# Patient Record
Sex: FEMALE | Race: WHITE | NOT HISPANIC OR LATINO | Employment: FULL TIME | URBAN - NONMETROPOLITAN AREA
[De-identification: names, ages, dates, MRNs, and addresses within clinical notes are randomized per-mention and may not be internally consistent; named-entity substitution may affect disease eponyms.]

---

## 2017-09-08 ENCOUNTER — OFFICE VISIT - GICH (OUTPATIENT)
Dept: FAMILY MEDICINE | Facility: OTHER | Age: 42
End: 2017-09-08

## 2017-09-08 ENCOUNTER — HISTORY (OUTPATIENT)
Dept: FAMILY MEDICINE | Facility: OTHER | Age: 42
End: 2017-09-08

## 2017-09-08 DIAGNOSIS — N99.820 POSTPROCEDURAL HEMORRHAGE OF A GENITOURINARY SYSTEM ORGAN OR STRUCTURE FOLLOWING A GENITOURINARY SYSTEM PROCEDURE: ICD-10-CM

## 2017-09-08 DIAGNOSIS — R10.9 ABDOMINAL PAIN: ICD-10-CM

## 2017-09-08 DIAGNOSIS — R39.9 UNSPECIFIED SYMPTOMS AND SIGNS INVOLVING THE GENITOURINARY SYSTEM: ICD-10-CM

## 2017-09-08 LAB
ABSOLUTE BASOPHILS - HISTORICAL: 0.1 THOU/CU MM
ABSOLUTE EOSINOPHILS - HISTORICAL: 0.3 THOU/CU MM
ABSOLUTE IMMATURE GRANULOCYTES(METAS,MYELOS,PROS) - HISTORICAL: 0.1 THOU/CU MM
ABSOLUTE LYMPHOCYTES - HISTORICAL: 2.5 THOU/CU MM (ref 0.9–2.9)
ABSOLUTE MONOCYTES - HISTORICAL: 0.5 THOU/CU MM
ABSOLUTE NEUTROPHILS - HISTORICAL: 6.4 THOU/CU MM (ref 1.7–7)
BACTERIA URINE: ABNORMAL BACTERIA/HPF
BASOPHILS # BLD AUTO: 0.6 %
BILIRUB UR QL: NEGATIVE
CLARITY, URINE: CLEAR CLARITY
COLOR UR: YELLOW COLOR
EOSINOPHIL NFR BLD AUTO: 2.5 %
EPITHELIAL CELLS: ABNORMAL EPI/HPF
ERYTHROCYTE [DISTWIDTH] IN BLOOD BY AUTOMATED COUNT: 11.9 % (ref 11.5–15.5)
GLUCOSE URINE: NEGATIVE MG/DL
HCT VFR BLD AUTO: 35.3 % (ref 33–51)
HEMOGLOBIN: 11.9 G/DL (ref 12–16)
IMMATURE GRANULOCYTES(METAS,MYELOS,PROS) - HISTORICAL: 0.7 %
KETONES UR QL: NEGATIVE MG/DL
LEUKOCYTE ESTERASE URINE: ABNORMAL
LYMPHOCYTES NFR BLD AUTO: 25.2 % (ref 20–44)
MCH RBC QN AUTO: 29.5 PG (ref 26–34)
MCHC RBC AUTO-ENTMCNC: 33.7 G/DL (ref 32–36)
MCV RBC AUTO: 87 FL (ref 80–100)
MONOCYTES NFR BLD AUTO: 5.4 %
NEUTROPHILS NFR BLD AUTO: 65.6 % (ref 42–72)
NITRITE UR QL STRIP: NEGATIVE
OCCULT BLOOD,URINE - HISTORICAL: ABNORMAL
PH UR: 5.5 [PH]
PLATELET # BLD AUTO: 315 THOU/CU MM (ref 140–440)
PMV BLD: 9.6 FL (ref 6.5–11)
PROTEIN QUALITATIVE,URINE - HISTORICAL: NEGATIVE MG/DL
RBC - HISTORICAL: ABNORMAL /HPF
RED BLOOD COUNT - HISTORICAL: 4.04 MIL/CU MM (ref 4–5.2)
SP GR UR STRIP: 1.01
UROBILINOGEN,QUALITATIVE - HISTORICAL: NORMAL EU/DL
WBC - HISTORICAL: ABNORMAL /HPF
WHITE BLOOD COUNT - HISTORICAL: 9.8 THOU/CU MM (ref 4.5–11)

## 2017-09-10 LAB — CULTURE - HISTORICAL: NORMAL

## 2017-09-11 ENCOUNTER — COMMUNICATION - GICH (OUTPATIENT)
Dept: FAMILY MEDICINE | Facility: OTHER | Age: 42
End: 2017-09-11

## 2017-12-28 NOTE — TELEPHONE ENCOUNTER
Patient Information     Patient Name MRN Sex Marialuisa Levi [id number] Female 1975      Telephone Encounter by Yessenia Gilbert at 2017  4:32 PM     Author:  Yessenia Gilbert Service:  (none) Author Type:  (none)     Filed:  2017  4:34 PM Encounter Date:  2017 Status:  Signed     :  Yessenia Gilbert            PT CALLED.  SAW BLANCA JOHNSON ON . WOULD LIKE TO KNOW RESULTS OF TEST.  CALL [phone]

## 2017-12-28 NOTE — PROGRESS NOTES
Patient Information     Patient Name MRN Sex Marialuisa Levi [id number] Female 1975      Progress Notes by Kate Lofton NP at 2017  4:15 PM     Author:  Kate Lofton NP Service:  (none) Author Type:  PHYS- Nurse Practitioner     Filed:  2017  5:54 PM Encounter Date:  2017 Status:  Signed     :  Kate Lofton NP (PHYS- Nurse Practitioner)            Nursing Notes:   Fannie Streeter  2017  4:44 PM  Signed  Patient presents to the clinic for post hysterectomy. Surgery was approx two weeks ago, felt great the first week, now this week has been experiencing lower back pain. Wants to rule out UTI. S/s include lower back pain and bleeding. Bleeding is occurring all the time. Soaking more than one pad a day. Is bright red blood. Maybe concerned about popping a stitch?   Fannie Streeter LPN............................ 2017 4:36 PM     HPI:   Marialuisa Salgado is a 41 y.o. female who presents for bladder concerns.  She had laparoscopic hysterectomy two weeks ago by Dr. Berkowitz at [facility] in [place].  States she was catheterized x 1 for 600 ml after the procedure.  Increased urinary frequency.  No urgency.  No dysuria or pain.  Some back pain earlier this week, resolved now.  Alternating warm and cold but states this is her normal, no change.  No fevers.  No nausea or vomiting.  Appetite fair.  Chronic diarrhea, no change.   States feeling fine the first week after the surgery.  Now with lower back pain and vaginal bleeding the past week.   States just bleeding the first week with wiping.  Now this week she states she is using one pad per day except yesterday needed two pads, back to one pad today.  Blood is bright red, until today the blood is both red and brown.  Yesterday she felt a gush of blood while urinating and noted increased blood after urinating.  Denies any clots or tissues. Left abdominal laparoscopic incision site with visible suture.  Localized  "tenderness on the left side - she reports the surgeon told her to expect more pain on the left due to having to cut away some tissue around the left ovary.  Both ovaries are still present per patient.  Denies any vaginal pain.  Denies any vaginal irritation or itching.  Denies any dizziness or light headedness.  Fatigued.   States she gets her medical care in [place].  Occasional Ibuprofen.  Took Aleve yesterday which states really helped.          No past medical history on file.    No past surgical history on file.    Social History     Substance Use Topics       Smoking status: Never Smoker     Smokeless tobacco: Never Used     Alcohol use No       No current outpatient prescriptions on file.     No current facility-administered medications for this visit.      Medications have been reviewed by me and are current to the best of my knowledge and ability.      Allergies     Allergen  Reactions     Amoxicillin Rash       REVIEW OF SYSTEMS:  Refer to HPI.      EXAM:   Vitals:    /64  Pulse 74  Temp 97.4  F (36.3  C) (Tympanic)  Resp 18  Ht 1.549 m (5' 1\")  Wt 90.7 kg (200 lb)  LMP 05/20/2015  Breastfeeding? No  BMI 37.79 kg/m2    General Appearance: Pleasant, alert, appropriate appearance for age. No acute distress  Chest/Respiratory Exam: Normal chest wall and respirations. Clear to auscultation.  Cardiovascular Exam: Regular rate and rhythm. S1, S2, no murmur, click, gallop, or rubs.  Gastrointestinal Exam: Soft, no masses or organomegaly. Abdomen with mild generalized tenderness over the lower quadrants bilaterally.  No rebound tenderness or guarding.  Normal BS x 4. Diffuse suprapubic tenderness. Mild bilateral CVA tenderness to palpation  Genital Female Exam:  Patient refuses vaginal exam at this time  Psychiatric Exam: Alert and oriented - appropriate affect.    Labs:   Results for orders placed or performed in visit on 09/08/17      URINALYSIS W REFLEX MICROSCOPIC IF POSITIVE      Result  " Value Ref Range    COLOR                     Yellow Yellow Color    CLARITY                   Clear Clear Clarity    SPECIFIC GRAVITY,URINE    1.010 1.010, 1.015, 1.020, 1.025                    PH,URINE                  5.5 6.0, 7.0, 8.0, 5.5, 6.5, 7.5, 8.5                    UROBILINOGEN,QUALITATIVE  Normal Normal EU/dl    PROTEIN, URINE Negative Negative mg/dL    GLUCOSE, URINE Negative Negative mg/dL    KETONES,URINE             Negative Negative mg/dL    BILIRUBIN,URINE           Negative Negative                    OCCULT BLOOD,URINE        Large (A) Negative                    NITRITE                   Negative Negative                    LEUKOCYTE ESTERASE        Moderate (A) Negative                   URINALYSIS MICROSCOPIC      Result  Value Ref Range    RBC 3-5 (A) 0-2, None Seen /HPF    WBC 6-10 (A) 0-2, 3-5, None Seen /HPF    BACTERIA                  Few None Seen, Rare, Occasional, Few Bacteria/HPF    EPITHELIAL CELLS          None Seen None Seen, Few Epi/HPF   CBC WITH AUTO DIFFERENTIAL      Result  Value Ref Range    WHITE BLOOD COUNT         9.8 4.5 - 11.0 thou/cu mm    RED BLOOD COUNT           4.04 4.00 - 5.20 mil/cu mm    HEMOGLOBIN                11.9 (L) 12.0 - 16.0 g/dL    HEMATOCRIT                35.3 33.0 - 51.0 %    MCV                       87 80 - 100 fL    MCH                       29.5 26.0 - 34.0 pg    MCHC                      33.7 32.0 - 36.0 g/dL    RDW                       11.9 11.5 - 15.5 %    PLATELET COUNT            315 140 - 440 thou/cu mm    MPV                       9.6 6.5 - 11.0 fL    NEUTROPHILS               65.6 42.0 - 72.0 %    LYMPHOCYTES               25.2 20.0 - 44.0 %    MONOCYTES                 5.4 <12.0 %    EOSINOPHILS               2.5 <8.0 %    BASOPHILS                 0.6 <3.0 %    IMMATURE GRANULOCYTES(METAS,MYELOS,PROS) 0.7 %    ABSOLUTE NEUTROPHILS      6.4 1.7 - 7.0 thou/cu mm    ABSOLUTE LYMPHOCYTES      2.5 0.9 - 2.9 thou/cu mm    ABSOLUTE  MONOCYTES        0.5 <0.9 thou/cu mm    ABSOLUTE EOSINOPHILS      0.3 <0.5 thou/cu mm    ABSOLUTE BASOPHILS        0.1 <0.3 thou/cu mm    ABSOLUTE IMMATURE GRANULOCYTES(METAS,MYELOS,PROS) 0.1 <=0.3 thou/cu mm       ASSESSMENT AND PLAN:      ICD-10-CM    1. Flank pain R10.9 URINALYSIS W REFLEX MICROSCOPIC IF POSITIVE      URINALYSIS W REFLEX MICROSCOPIC IF POSITIVE      URINALYSIS MICROSCOPIC      URINALYSIS MICROSCOPIC      URINE CULTURE   2. Postoperative vaginal bleeding following genitourinary procedure N99.820 CBC WITH DIFFERENTIAL      CBC WITH DIFFERENTIAL      CBC WITH AUTO DIFFERENTIAL   3. UTI symptoms R39.9 trimethoprim-sulfamethoxazole, 160-800 mg, (BACTRIM DS, SEPTRA DS) tablet       I spoke with Dr. Stroud, OB/GYN on call from [facility] via phone regarding patient's symptoms of vaginal bleeding.   Per Dr. Stroud, the vaginal bleeding is not concerning at the current amount.  Patient is afebrile.  No vomiting, etc.  Check CBC and is WBC and Hgb stable then can discharge home.  Follow up early next week with Dr. House if symptoms persist.  Go to ER sooner if worsening, fevers, vomiting, or concerns.  Post op hgb of 11.5 at [facility].      CBC - hgb stable at 11.9, normal WBC  Urinalysis - mild RBCs, mild WBCs, few bacteria  Urine culture pending  Bactrim 160-800 mg BID x 3 days  Encouraged fluids and frequent bladder emptying.  Will call if culture warrants change of abx.   Follow up with Dr. House next week if symptoms persist, go to ER if worsening or concerns       Patient Instructions   Hemoglobin stable    Normal blood counts     Antibiotic has been sent to pharmacy. Please take full course of antibiotic even if symptoms have completely resolved. This helps prevent against antibiotic resistance.     We will culture the urine to see what bacteria grows out of the urine.  We will call the patient if a change of antibiotic is necessary per the culture.      Patient was instructed in  increase fluids including water and cranberry juice.      Monitor for fevers, chills, vomiting - follow up if occurring.    Return to clinic if symptoms are not resolved.  Call clinic if symptoms change/worsen.          BLANCA JOHNSON NP..................9/8/2017 4:41 PM

## 2017-12-28 NOTE — TELEPHONE ENCOUNTER
Patient Information     Patient Name MRN Sex Marialuisa Levi [id number] Female 1975      Telephone Encounter by Blanca Johnson NP at 2017  8:40 PM     Author:  Blanca Johnson NP Service:  (none) Author Type:  PHYS- Nurse Practitioner     Filed:  2017  8:41 PM Encounter Date:  2017 Status:  Signed     :  Blanca Johnson NP (PHYS- Nurse Practitioner)            No urine infection per culture  Showed low amounts of mixed bacteria - likely contaminants.    BLANCA JOHNSON NP ....................  2017   8:40 PM

## 2017-12-28 NOTE — TELEPHONE ENCOUNTER
Patient Information     Patient Name MRN Marialuisa Braun [id number] Female 1975      Telephone Encounter by Fannie Streeter at 2017  8:38 PM     Author:  Fannie Streeter Service:  (none) Author Type:  NURS- Student Practical Nurse     Filed:  2017  8:39 PM Encounter Date:  2017 Status:  Signed     :  Fannie Streeter (NURS- Student Practical Nurse)            Patient is looking for urine culture results. Please advise.   Fannie Streeter LPN............................ 2017 8:38 PM

## 2017-12-28 NOTE — PATIENT INSTRUCTIONS
Patient Information     Patient Name MRN Sex Marialuisa Levi [id number] Female 1975      Patient Instructions by Kate Lofton NP at 2017  4:15 PM     Author:  Kate Lofton NP Service:  (none) Author Type:  PHYS- Nurse Practitioner     Filed:  2017  5:32 PM Encounter Date:  2017 Status:  Signed     :  Kate Lofton NP (PHYS- Nurse Practitioner)            Hemoglobin stable    Normal blood counts     Antibiotic has been sent to pharmacy. Please take full course of antibiotic even if symptoms have completely resolved. This helps prevent against antibiotic resistance.     We will culture the urine to see what bacteria grows out of the urine.  We will call the patient if a change of antibiotic is necessary per the culture.      Patient was instructed in increase fluids including water and cranberry juice.      Monitor for fevers, chills, vomiting - follow up if occurring.    Return to clinic if symptoms are not resolved.  Call clinic if symptoms change/worsen.

## 2017-12-28 NOTE — TELEPHONE ENCOUNTER
Patient Information     Patient Name MRN Marialuisa Braun [id number] Female 1975      Telephone Encounter by Fannie Streeter at 2017  8:43 PM     Author:  Fannie Streeter Service:  (none) Author Type:  NURS- Student Practical Nurse     Filed:  2017  8:44 PM Encounter Date:  2017 Status:  Signed     :  Fannie Streeter (NURS- Student Practical Nurse)            Called and spoke with patient after proper verification. Informed patient of below message from Kate Lofton NP. Patient states understanding and no further questions at this time.    Fannie Streeter LPN............................ 2017 8:43 PM

## 2017-12-30 NOTE — NURSING NOTE
Patient Information     Patient Name MRN Marialuisa Braun [id number] Female 1975      Nursing Note by Fannie Streeter at 2017  4:15 PM     Author:  Fannie Streeter Service:  (none) Author Type:  NURS- Student Practical Nurse     Filed:  2017  4:44 PM Encounter Date:  2017 Status:  Signed     :  Fannie Streeter (NURS- Student Practical Nurse)            Patient presents to the clinic for post hysterectomy. Surgery was approx two weeks ago, felt great the first week, now this week has been experiencing lower back pain. Wants to rule out UTI. S/s include lower back pain and bleeding. Bleeding is occurring all the time. Soaking more than one pad a day. Is bright red blood. Maybe concerned about popping a stitch?   Fannie Streeter LPN............................ 2017 4:36 PM

## 2018-01-24 ENCOUNTER — DOCUMENTATION ONLY (OUTPATIENT)
Dept: FAMILY MEDICINE | Facility: OTHER | Age: 43
End: 2018-01-24

## 2018-01-26 VITALS
RESPIRATION RATE: 18 BRPM | WEIGHT: 200 LBS | HEART RATE: 74 BPM | HEIGHT: 61 IN | TEMPERATURE: 97.4 F | BODY MASS INDEX: 37.76 KG/M2 | DIASTOLIC BLOOD PRESSURE: 64 MMHG | SYSTOLIC BLOOD PRESSURE: 110 MMHG

## 2018-11-04 ENCOUNTER — OFFICE VISIT (OUTPATIENT)
Dept: FAMILY MEDICINE | Facility: OTHER | Age: 43
End: 2018-11-04
Payer: COMMERCIAL

## 2018-11-04 VITALS
HEART RATE: 92 BPM | BODY MASS INDEX: 39.41 KG/M2 | TEMPERATURE: 98.2 F | SYSTOLIC BLOOD PRESSURE: 122 MMHG | DIASTOLIC BLOOD PRESSURE: 80 MMHG | WEIGHT: 208.6 LBS | OXYGEN SATURATION: 95 %

## 2018-11-04 DIAGNOSIS — L51.9 ERYTHEMA MULTIFORME: ICD-10-CM

## 2018-11-04 PROCEDURE — 99213 OFFICE O/P EST LOW 20 MIN: CPT | Performed by: FAMILY MEDICINE

## 2018-11-04 ASSESSMENT — PAIN SCALES - GENERAL: PAINLEVEL: NO PAIN (0)

## 2018-11-04 NOTE — MR AVS SNAPSHOT
"              After Visit Summary   2018    Marialuisa Salgado    MRN: [id number]           Patient Information     Date Of Birth          1975        Visit Information        Provider Department      2018 2:00 PM Shorty Castelan MD [facility]        Today's Diagnoses     Erythema multiforme           Follow-ups after your visit        Who to contact     If you have questions or need follow up information about today's clinic visit or your schedule please contact [facility] directly at [phone].  Normal or non-critical lab and imaging results will be communicated to you by [employer], letter or phone within 4 business days after the clinic has received the results. If you do not hear from us within 7 days, please contact the clinic through [employer] or phone. If you have a critical or abnormal lab result, we will notify you by phone as soon as possible.  Submit refill requests through [employer] or call your pharmacy and they will forward the refill request to us. Please allow 3 business days for your refill to be completed.          Additional Information About Your Visit        MyChart Information     [employer] lets you send messages to your doctor, view your test results, renew your prescriptions, schedule appointments and more. To sign up, go to [email or web address] . Click on \"Log in\" on the left side of the screen, which will take you to the Welcome page. Then click on \"Sign up Now\" on the right side of the page.     You will be asked to enter the access code listed below, as well as some personal information. Please follow the directions to create your username and password.     Your access code is: [email or web address]  Expires: 2/3/2019  7:54 AM     Your access code will  in 90 days. If you need help or a new code, please call your [facility] or [phone].        Care EveryWhere ID     This is your Care EveryWhere ID. This could be used by other " organizations to access your [place] medical records  [id number]        Your Vitals Were     Pulse Temperature Pulse Oximetry Breastfeeding? BMI (Body Mass Index)       92 98.2  F (36.8  C) (Tympanic) 95% No 39.41 kg/m2        Blood Pressure from Last 3 Encounters:   11/04/18 122/80   09/08/17 110/64   06/25/16 122/82    Weight from Last 3 Encounters:   11/04/18 208 lb 9.6 oz (94.6 kg)   09/08/17 200 lb (90.7 kg)   06/25/16 201 lb (91.2 kg)              Today, you had the following     No orders found for display       Primary Care Provider Office Phone # Fax #    Yady Barbour [phone]       [facility] [address] [facility] [address]        Equal Access to Services     ARNULFO ALCANTAR : Imelda Tolbert, warowena beck, julian kaalgrant moise, noemí sullivan . So [facility] [phone].    ATENCIÓN: Si habla español, tiene a gilmore disposición servicios gratuitos de asistencia lingüística. Tirso al [phone].    We comply with applicable federal civil rights laws and [place] laws. We do not discriminate on the basis of race, color, national origin, age, disability, sex, sexual orientation, or gender identity.            Thank you!     Thank you for choosing [facility] AND \[facility]\""  for your care. Our goal is always to provide you with excellent care. Hearing back from our patients is one way we can continue to improve our services. Please take a few minutes to complete the written survey that you may receive in the mail after your visit with us. Thank you!             Your Updated Medication List - Protect others around you: Learn how to safely use, store and throw away your medicines at www.disposemymeds.org.      Notice  As of 11/4/2018 11:59 PM    You have not been prescribed any medications.

## 2018-11-04 NOTE — NURSING NOTE
Patient presents to the clinic for a rash of her trunk and is now on the back of her right leg for a few days.  Yue Guerrero LPN 11/4/2018   2:09 PM    Med rec-complete

## 2018-11-05 PROBLEM — L51.9 ERYTHEMA MULTIFORME: Status: ACTIVE | Noted: 2018-11-05

## 2018-11-05 NOTE — PROGRESS NOTES
SUBJECTIVE:   Marialuisa Salgado is a 42 year old female who presents to clinic today for the following health issues: Patient arrives here for rash    HPI Comments: Patient reports the rash is been going on since last Thursday.  Reports is itchy.  Mainly located around the abdomen.  She has been taking Benadryl without any improvement.  Denies any other complaints at weight loss no exposures that she is aware of.  She denies any cough or congestion.        There are no active problems to display for this patient.    History reviewed. No pertinent past medical history.   History reviewed. No pertinent surgical history.  No current outpatient prescriptions on file.     Allergies   Allergen Reactions     Amoxicillin Rash       Review of Systems     OBJECTIVE:     /80 (BP Location: Left arm, Patient Position: Sitting, Cuff Size: Adult Regular)  Pulse 92  Temp 98.2  F (36.8  C) (Tympanic)  Wt 208 lb 9.6 oz (94.6 kg)  SpO2 95%  Breastfeeding? No  BMI 39.41 kg/m2  Body mass index is 39.41 kg/(m^2).  Physical Exam   Constitutional: She appears well-developed.   HENT:   Head: Normocephalic.   Right Ear: External ear normal.   Eyes: Pupils are equal, round, and reactive to light.   Cardiovascular: Normal rate and regular rhythm.    Pulmonary/Chest: Effort normal and breath sounds normal.   Abdominal: Soft.   Musculoskeletal: Normal range of motion.   Neurological: She is alert.   Skin:   Patient has multiple annular lesions with some central clearing.  No scaling.  Biggest one measures about 2-3 cm mainly located around the abdomen there is no ulcerations.  Lesions are not palpable or tender       none     ASSESSMENT/PLAN:         1. Erythema multiforme  Etiology unclear.  Likely viral in origin.  Doubt vasculitis.  Recommended observation.  Benadryl as needed.  Follow-up if getting worse.        Shorty Castelan MD  [facility]

## 2019-01-11 ENCOUNTER — OFFICE VISIT (OUTPATIENT)
Dept: FAMILY MEDICINE | Facility: OTHER | Age: 44
End: 2019-01-11
Attending: NURSE PRACTITIONER
Payer: COMMERCIAL

## 2019-01-11 VITALS
TEMPERATURE: 96.9 F | DIASTOLIC BLOOD PRESSURE: 80 MMHG | HEIGHT: 62 IN | RESPIRATION RATE: 16 BRPM | HEART RATE: 74 BPM | BODY MASS INDEX: 38.26 KG/M2 | WEIGHT: 207.9 LBS | SYSTOLIC BLOOD PRESSURE: 108 MMHG

## 2019-01-11 DIAGNOSIS — J02.9 VIRAL PHARYNGITIS: Primary | ICD-10-CM

## 2019-01-11 DIAGNOSIS — R07.0 THROAT PAIN: ICD-10-CM

## 2019-01-11 LAB
DEPRECATED S PYO AG THROAT QL EIA: NORMAL
SPECIMEN SOURCE: NORMAL

## 2019-01-11 PROCEDURE — 87081 CULTURE SCREEN ONLY: CPT | Performed by: NURSE PRACTITIONER

## 2019-01-11 PROCEDURE — 87880 STREP A ASSAY W/OPTIC: CPT | Performed by: NURSE PRACTITIONER

## 2019-01-11 PROCEDURE — 99214 OFFICE O/P EST MOD 30 MIN: CPT | Performed by: NURSE PRACTITIONER

## 2019-01-11 RX ORDER — CETIRIZINE HYDROCHLORIDE 10 MG/1
10 TABLET ORAL
COMMUNITY
End: 2022-04-07

## 2019-01-11 RX ORDER — IBUPROFEN 800 MG/1
800 TABLET, FILM COATED ORAL
COMMUNITY
Start: 2017-08-26 | End: 2022-04-07

## 2019-01-11 RX ORDER — ALBUTEROL SULFATE 90 UG/1
2 AEROSOL, METERED RESPIRATORY (INHALATION)
COMMUNITY
Start: 2011-03-15 | End: 2022-04-07

## 2019-01-11 RX ORDER — ACETAMINOPHEN 500 MG
1000 TABLET ORAL
COMMUNITY
Start: 2017-08-26 | End: 2022-04-07

## 2019-01-11 ASSESSMENT — MIFFLIN-ST. JEOR: SCORE: 1548.34

## 2019-01-11 ASSESSMENT — PAIN SCALES - GENERAL: PAINLEVEL: EXTREME PAIN (9)

## 2019-01-12 NOTE — PATIENT INSTRUCTIONS
Rapid strep is negative, throat culture is pending.  Will call you if culture is positive and treat with antibiotics if needed.   Rest and push fluids. Take Tylenol or ibuprofen for pain if needed.   F/u with Primary Care in 3 days if not improving.    Patient Education     When You Have a Sore Throat    A sore throat can be painful. There are many reasons why you may have a sore throat. Your healthcare provider will work with you to find the cause of your sore throat. He or she will also find the best treatment for you.  What causes a sore throat?  Sore throats can be caused or worsened by:    Cold or flu viruses    Bacteria    Irritants such as tobacco smoke or air pollution    Acid reflux  A healthy throat  The tonsils are on the sides of the throat near the base of the tongue. They collect viruses and bacteria and help fight infection. The throat (pharynx) is the passage for air. Mucus from the nasal cavity also moves down the passage.  An inflamed throat  The tonsils and pharynx can become inflamed due to a cold or flu virus. Postnasal drip (excess mucus draining from the nasal cavity) can irritate the throat. It can also make the throat or tonsils more likely to be infected by bacteria. Severe, untreated tonsillitis in children or adults can cause a pocket of pus (abscess) to form near the tonsil.  Your evaluation  A medical evaluation can help find the cause of your sore throat. It can also help your healthcare provider choose the best treatment for you. The evaluation may include a health history, physical exam, and diagnostic tests.  Health history  Your healthcare provider may ask you the following:    How long has the sore throat lasted and how have you been treating it?    Do you have any other symptoms, such as body aches, fever, or cough?    Does your sore throat recur? If so, how often? How many days of school or work have you missed because of a sore throat?    Do you have trouble eating or  "swallowing?    Have you been told that you snore or have other sleep problems?    Do you have bad breath?    Do you cough up bad-tasting mucus?  Physical exam  During the exam, your healthcare provider checks your ears, nose, and throat for problems. He or she also checks for swelling in the neck, and may listen to your chest.  Possible tests  Other tests your healthcare provider may perform include:    A throat swab to check for bacteria such as streptococcus (the bacteria that causes strep throat)    A blood test to check for mononucleosis (a viral infection)    A chest X-ray to rule out pneumonia, especially if you have a cough  Treating a sore throat  Treatment depends on many factors. What is the likely cause? Is the problem recent? Does it keep coming back? In many cases, the best thing to do is to treat the symptoms, rest, and let the problem heal itself. Antibiotics may help clear up some bacterial infections. For cases of severe or recurring tonsillitis, the tonsils may need to be removed.  Relieving your symptoms    Don t smoke, and avoid secondhand smoke.    For children, try throat sprays or Popsicles. Adults and older children may try lozenges.    Drink warm liquids to soothe the throat and help thin mucus. Avoid alcohol, spicy foods, and acidic drinks such as orange juice. These can irritate the throat.    Gargle with warm saltwater (1 teaspoon of salt to 8 ounces of warm water).    Use a humidifier to keep air moist and relieve throat dryness.    Try over-the-counter pain relievers such as acetaminophen or ibuprofen. Use as directed, and don t exceed the recommended dose. Don t give aspirin to children.   Are antibiotics needed?  If your sore throat is due to a bacterial infection, antibiotics may speed healing and prevent complications. Although group A streptococcus (\"strep throat\" or GAS) is the major treatable infection for a sore throat, GAS causes only 5% to 15% of sore throats in adults who " seek medical care. Most sore throats are caused by cold or flu viruses. And antibiotics don t treat viral illness. In fact, using antibiotics when they re not needed may produce bacteria that are harder to kill. Your healthcare provider will prescribe antibiotics only if he or she thinks they are likely to help.  If antibiotics are prescribed  Take the medicine exactly as directed. Be sure to finish your prescription even if you re feeling better. And be sure to ask your healthcare provider or pharmacist what side effects are common and what to do about them.  Is surgery needed?  In some cases, tonsils need to be removed. This is often done as outpatient (same-day) surgery. Your healthcare provider may advise removing the tonsils in cases of:    Several severe bouts of tonsillitis in a year.  Severe  episodes include those that lead to missed days of school or work, or that need to be treated with antibiotics.    Tonsillitis that causes breathing problems during sleep    Tonsillitis caused by food particles collecting in pouches in the tonsils (cryptic tonsillitis)  Call your healthcare provider if any of the following occur:    Symptoms worsen, or new symptoms develop.    Swollen tonsils make breathing difficult.    The pain is severe enough to keep you from drinking liquids.    A skin rash, hives, or wheezing develops. Any of these could signal an allergic reaction to antibiotics.    Symptoms don t improve within a week.    Symptoms don t improve within 2 to 3 days of starting antibiotics.   Date Last Reviewed: 10/1/2016    6094-3236 [employer]. [address] [place] [address], [place] [address]. All rights reserved. This information is not intended as a substitute for professional medical care. Always follow your healthcare professional's instructions.

## 2019-01-12 NOTE — NURSING NOTE
Onset:   1/4/19  Fever:  y  Exposure:  y  Pain scale:  9  Headache:y    Rash:  n  Associated symptoms:  Stomach ache  Treating with tylenol cold and sinus.  Claire Victoria LPN....................  1/11/2019   6:52 PM    Chief Complaint   Patient presents with     Throat Problem       Medication Reconciliation: complete    Claire Victoria LPN

## 2019-01-12 NOTE — PROGRESS NOTES
Chief Complaint   Patient presents with     Throat Problem     SUBJECTIVE:   Marialuisa Salgado presents with a sore throat for 7 days. Slight cough, some sinus congestion, no fever. Taking decongestants and Tylenol for symptoms. Exposed many illnesses at work. Eating and drinking ok. Thought it was her reflux, taking ranitidine without improvement of symptoms.      Nursing Notes:   Claire Victoria LPN  1/11/2019  6:52 PM  Sign at exiting of workspace  Onset:   1/4/19  Fever:  y  Exposure:  y  Pain scale:  9  Headache:y    Rash:  n  Associated symptoms:  Stomach ache  Treating with tylenol cold and sinus.  Claire Victoria LPN....................  1/11/2019   6:52 PM    Chief Complaint   Patient presents with     Throat Problem       Medication Reconciliation: complete    Claire Victoria LPN          Allergies   Allergen Reactions     Amoxicillin Rash     Patient Active Problem List   Diagnosis     Erythema multiforme     Current Outpatient Medications   Medication     acetaminophen (TYLENOL) 500 MG tablet     ibuprofen (ADVIL/MOTRIN) 800 MG tablet     albuterol (PROVENTIL HFA) 108 (90 Base) MCG/ACT inhaler     cetirizine (ZYRTEC) 10 MG tablet     No current facility-administered medications for this visit.      History reviewed. No pertinent past medical history.  History reviewed. No pertinent surgical history.  Social History     Socioeconomic History     Marital status:      Spouse name: Not on file     Number of children: Not on file     Years of education: Not on file     Highest education level: Not on file   Social Needs     Financial resource strain: Not on file     Food insecurity - worry: Not on file     Food insecurity - inability: Not on file     Transportation needs - medical: Not on file     Transportation needs - non-medical: Not on file   Occupational History     Not on file   Tobacco Use     Smoking status: Never Smoker     Smokeless tobacco: Never Used   Substance and Sexual Activity      "Alcohol use: No     Drug use: No     Sexual activity: Not on file   Other Topics Concern     Parent/sibling w/ CABG, MI or angioplasty before 65F 55M? Not Asked   Social History Narrative     Not on file     ROS:  no fever, eating and drinking some.  Has a cough and sinus congestion.  Sore throat is persistent and not improving. Denies abdominal pain.  No rash.  Has a known exposure to strep at school.    /80 (BP Location: Left arm, Patient Position: Sitting, Cuff Size: Adult Large)   Pulse 74   Temp 96.9  F (36.1  C) (Tympanic)   Resp 16   Ht 1.562 m (5' 1.5\")   Wt 94.3 kg (207 lb 14.4 oz)   Breastfeeding? No   BMI 38.65 kg/m    EXAM:  Constitutional: healthy, alert and no distress   Cardiovascular:  RRR. No murmurs, clicks gallops or rub  Respiratory: Lungs clear  Psychiatric: mentation appears normal and affect normal/bright  Head: Normocephalic.    ENT: ENT exam normal, no neck nodes or sinus tenderness  SKIN: no suspicious lesions or rashes  LYMPH: Normal cervical lymph nodes  JOINT/EXTREMITIES: extremities normal- no gross deformities noted and gait normal    ASSESSMENT:   (J02.9) Viral pharyngitis  (primary encounter diagnosis)    (R07.0) Throat pain  Plan: Strep, Rapid Screen, Throat Strep A Culture    Patient is afebrile, rapid strep is negative.   Throat culture added on d/t exposure.   Will notify if positive and treat PRN.  Will treat as viral and advised conservative treatments.   I explained my diagnostic considerations and recommendations to the patient, who voiced understanding and agreement with the treatment plan. All questions were answered. We discussed potential side effects of any prescribed or recommended therapies, as well as expectations for response to treatments.   Advised to contact PCP if there is no improvement or worsening of conditions or symptoms.  Given Epic educational materials.     "

## 2019-01-14 LAB
BACTERIA SPEC CULT: NORMAL
SPECIMEN SOURCE: NORMAL

## 2019-08-29 ENCOUNTER — OFFICE VISIT (OUTPATIENT)
Dept: FAMILY MEDICINE | Facility: OTHER | Age: 44
End: 2019-08-29
Attending: PHYSICIAN ASSISTANT
Payer: COMMERCIAL

## 2019-08-29 VITALS
DIASTOLIC BLOOD PRESSURE: 80 MMHG | TEMPERATURE: 97.4 F | HEART RATE: 80 BPM | RESPIRATION RATE: 18 BRPM | WEIGHT: 211.4 LBS | OXYGEN SATURATION: 97 % | HEIGHT: 62 IN | SYSTOLIC BLOOD PRESSURE: 112 MMHG | BODY MASS INDEX: 38.9 KG/M2

## 2019-08-29 DIAGNOSIS — R42 DIZZINESS: Primary | ICD-10-CM

## 2019-08-29 DIAGNOSIS — B34.9 VIRAL ILLNESS: ICD-10-CM

## 2019-08-29 LAB
ALBUMIN SERPL-MCNC: 4.2 G/DL (ref 3.5–5.7)
ALBUMIN UR-MCNC: NEGATIVE MG/DL
ALP SERPL-CCNC: 68 U/L (ref 34–104)
ALT SERPL W P-5'-P-CCNC: 18 U/L (ref 7–52)
ANION GAP SERPL CALCULATED.3IONS-SCNC: 8 MMOL/L (ref 3–14)
APPEARANCE UR: CLEAR
AST SERPL W P-5'-P-CCNC: 13 U/L (ref 13–39)
BASOPHILS # BLD AUTO: 0.1 10E9/L (ref 0–0.2)
BASOPHILS NFR BLD AUTO: 1.1 %
BILIRUB SERPL-MCNC: 0.3 MG/DL (ref 0.3–1)
BILIRUB UR QL STRIP: NEGATIVE
BUN SERPL-MCNC: 12 MG/DL (ref 7–25)
CALCIUM SERPL-MCNC: 9.1 MG/DL (ref 8.6–10.3)
CHLORIDE SERPL-SCNC: 104 MMOL/L (ref 98–107)
CO2 SERPL-SCNC: 25 MMOL/L (ref 21–31)
COLOR UR AUTO: YELLOW
CREAT SERPL-MCNC: 0.72 MG/DL (ref 0.6–1.2)
CRP SERPL-MCNC: 0.7 MG/L
DIFFERENTIAL METHOD BLD: NORMAL
EOSINOPHIL # BLD AUTO: 0.3 10E9/L (ref 0–0.7)
EOSINOPHIL NFR BLD AUTO: 4.3 %
ERYTHROCYTE [DISTWIDTH] IN BLOOD BY AUTOMATED COUNT: 12.5 % (ref 10–15)
GFR SERPL CREATININE-BSD FRML MDRD: 89 ML/MIN/{1.73_M2}
GLUCOSE SERPL-MCNC: 90 MG/DL (ref 70–105)
GLUCOSE UR STRIP-MCNC: NEGATIVE MG/DL
HCT VFR BLD AUTO: 40.6 % (ref 35–47)
HGB BLD-MCNC: 13.8 G/DL (ref 11.7–15.7)
HGB UR QL STRIP: NEGATIVE
IMM GRANULOCYTES # BLD: 0 10E9/L (ref 0–0.4)
IMM GRANULOCYTES NFR BLD: 0.3 %
KETONES UR STRIP-MCNC: NEGATIVE MG/DL
LEUKOCYTE ESTERASE UR QL STRIP: NEGATIVE
LYMPHOCYTES # BLD AUTO: 2.6 10E9/L (ref 0.8–5.3)
LYMPHOCYTES NFR BLD AUTO: 39.7 %
MCH RBC QN AUTO: 29.9 PG (ref 26.5–33)
MCHC RBC AUTO-ENTMCNC: 34 G/DL (ref 31.5–36.5)
MCV RBC AUTO: 88 FL (ref 78–100)
MONOCYTES # BLD AUTO: 0.5 10E9/L (ref 0–1.3)
MONOCYTES NFR BLD AUTO: 7 %
NEUTROPHILS # BLD AUTO: 3.1 10E9/L (ref 1.6–8.3)
NEUTROPHILS NFR BLD AUTO: 47.6 %
NITRATE UR QL: NEGATIVE
PH UR STRIP: 5 PH (ref 5–9)
PLATELET # BLD AUTO: 314 10E9/L (ref 150–450)
POTASSIUM SERPL-SCNC: 3.9 MMOL/L (ref 3.5–5.1)
PROT SERPL-MCNC: 7 G/DL (ref 6.4–8.9)
RBC # BLD AUTO: 4.61 10E12/L (ref 3.8–5.2)
SODIUM SERPL-SCNC: 137 MMOL/L (ref 134–144)
SOURCE: NORMAL
SP GR UR STRIP: 1.02 (ref 1–1.03)
TSH SERPL DL<=0.05 MIU/L-ACNC: 4.28 IU/ML (ref 0.34–5.6)
UROBILINOGEN UR STRIP-ACNC: 0.2 EU/DL (ref 0.2–1)
WBC # BLD AUTO: 6.6 10E9/L (ref 4–11)

## 2019-08-29 PROCEDURE — 36415 COLL VENOUS BLD VENIPUNCTURE: CPT | Mod: ZL | Performed by: NURSE PRACTITIONER

## 2019-08-29 PROCEDURE — 81003 URINALYSIS AUTO W/O SCOPE: CPT | Mod: ZL,XU | Performed by: NURSE PRACTITIONER

## 2019-08-29 PROCEDURE — 84443 ASSAY THYROID STIM HORMONE: CPT | Mod: ZL | Performed by: NURSE PRACTITIONER

## 2019-08-29 PROCEDURE — 86140 C-REACTIVE PROTEIN: CPT | Mod: ZL | Performed by: NURSE PRACTITIONER

## 2019-08-29 PROCEDURE — 85025 COMPLETE CBC W/AUTO DIFF WBC: CPT | Mod: ZL | Performed by: NURSE PRACTITIONER

## 2019-08-29 PROCEDURE — 99215 OFFICE O/P EST HI 40 MIN: CPT | Performed by: NURSE PRACTITIONER

## 2019-08-29 PROCEDURE — 80053 COMPREHEN METABOLIC PANEL: CPT | Mod: ZL | Performed by: NURSE PRACTITIONER

## 2019-08-29 RX ORDER — MECLIZINE HYDROCHLORIDE 25 MG/1
12.5-25 TABLET ORAL 3 TIMES DAILY PRN
Qty: 15 TABLET | Refills: 0 | Status: SHIPPED | OUTPATIENT
Start: 2019-08-29 | End: 2019-09-03

## 2019-08-29 ASSESSMENT — ENCOUNTER SYMPTOMS
HEADACHES: 0
COUGH: 0
CONSTITUTIONAL NEGATIVE: 1
DIZZINESS: 1
MUSCULOSKELETAL NEGATIVE: 1
ABDOMINAL DISTENTION: 0
CARDIOVASCULAR NEGATIVE: 1
NUMBNESS: 0
RHINORRHEA: 1
ABDOMINAL PAIN: 0
HEMATOLOGIC/LYMPHATIC NEGATIVE: 1
PSYCHIATRIC NEGATIVE: 1
WEAKNESS: 0
NAUSEA: 1

## 2019-08-29 ASSESSMENT — PAIN SCALES - GENERAL: PAINLEVEL: NO PAIN (0)

## 2019-08-29 ASSESSMENT — MIFFLIN-ST. JEOR: SCORE: 1564.21

## 2019-08-29 NOTE — PROGRESS NOTES
SUBJECTIVE:   Marialuisa Salgado is a 43 year old female who presents to clinic today for the following health issues:    HPI  Presents with URI, dizzy and fever. Feels off. Unsure if it's from congestion. Eating and drinking okay, no belly pain, no urinary symptoms. Will get nauseous with dizziness. Did try taking benadryl last night and this morning. Denies ear pain, does have some HA.   Not taking any analgesics.     Patient Active Problem List    Diagnosis Date Noted     Erythema multiforme 11/05/2018     Priority: Medium     History reviewed. No pertinent past medical history.   History reviewed. No pertinent surgical history.  History reviewed. No pertinent family history.  Social History     Tobacco Use     Smoking status: Never Smoker     Smokeless tobacco: Never Used   Substance Use Topics     Alcohol use: No     Social History     Social History Narrative     Not on file     Current Outpatient Medications   Medication Sig Dispense Refill     acetaminophen (TYLENOL) 500 MG tablet Take 1,000 mg by mouth       cetirizine (ZYRTEC) 10 MG tablet Take 10 mg by mouth       ibuprofen (ADVIL/MOTRIN) 800 MG tablet Take 800 mg by mouth       meclizine (ANTIVERT) 25 MG tablet Take 0.5-1 tablets (12.5-25 mg) by mouth 3 times daily as needed for dizziness 15 tablet 0     albuterol (PROVENTIL HFA) 108 (90 Base) MCG/ACT inhaler Inhale 2 puffs into the lungs       Allergies   Allergen Reactions     Amoxicillin Rash       Review of Systems   Constitutional: Negative.    HENT: Positive for postnasal drip and rhinorrhea.    Respiratory: Negative for cough.    Cardiovascular: Negative.    Gastrointestinal: Positive for nausea. Negative for abdominal distention and abdominal pain.   Genitourinary: Negative.    Musculoskeletal: Negative.    Skin: Negative.    Neurological: Positive for dizziness. Negative for weakness, numbness and headaches.   Hematological: Negative.    Psychiatric/Behavioral: Negative.         OBJECTIVE:  "    /80   Pulse 80   Temp 97.4  F (36.3  C) (Tympanic)   Resp 18   Ht 1.562 m (5' 1.5\")   Wt 95.9 kg (211 lb 6.4 oz)   SpO2 97%   BMI 39.30 kg/m    Body mass index is 39.3 kg/m .  Physical Exam   Constitutional: She is oriented to person, place, and time. She appears well-developed and well-nourished. No distress.   HENT:   Head: Normocephalic and atraumatic.   Right Ear: External ear normal.   Left Ear: External ear normal.   Nose: Nose normal.   Mouth/Throat: Oropharynx is clear and moist. No oropharyngeal exudate.   Eyes: Pupils are equal, round, and reactive to light. Conjunctivae and EOM are normal. Right eye exhibits no discharge. Left eye exhibits no discharge.   Neck: Normal range of motion. Neck supple.   Cardiovascular: Normal rate, regular rhythm and normal heart sounds.   No murmur heard.  Pulmonary/Chest: Effort normal and breath sounds normal.   Abdominal: Soft. Bowel sounds are normal. She exhibits no distension. There is no tenderness.   Musculoskeletal: Normal range of motion.   Lymphadenopathy:     She has no cervical adenopathy.   Neurological: She is alert and oriented to person, place, and time. She is not disoriented. She exhibits normal muscle tone. Coordination and gait normal.   Skin: Skin is warm, dry and intact. No rash noted. She is not diaphoretic.   Psychiatric: She has a normal mood and affect. Her speech is normal and behavior is normal. Judgment and thought content normal. Cognition and memory are normal.   Nursing note and vitals reviewed.      Diagnostic Test Results:  Results for orders placed or performed in visit on 08/29/19 (from the past 24 hour(s))   CBC with platelets differential   Result Value Ref Range    WBC 6.6 4.0 - 11.0 10e9/L    RBC Count 4.61 3.8 - 5.2 10e12/L    Hemoglobin 13.8 11.7 - 15.7 g/dL    Hematocrit 40.6 35.0 - 47.0 %    MCV 88 78 - 100 fl    MCH 29.9 26.5 - 33.0 pg    MCHC 34.0 31.5 - 36.5 g/dL    RDW 12.5 10.0 - 15.0 %    Platelet Count 314 " 150 - 450 10e9/L    Diff Method Automated Method     % Neutrophils 47.6 %    % Lymphocytes 39.7 %    % Monocytes 7.0 %    % Eosinophils 4.3 %    % Basophils 1.1 %    % Immature Granulocytes 0.3 %    Absolute Neutrophil 3.1 1.6 - 8.3 10e9/L    Absolute Lymphocytes 2.6 0.8 - 5.3 10e9/L    Absolute Monocytes 0.5 0.0 - 1.3 10e9/L    Absolute Eosinophils 0.3 0.0 - 0.7 10e9/L    Absolute Basophils 0.1 0.0 - 0.2 10e9/L    Abs Immature Granulocytes 0.0 0 - 0.4 10e9/L   Comprehensive metabolic panel   Result Value Ref Range    Sodium 137 134 - 144 mmol/L    Potassium 3.9 3.5 - 5.1 mmol/L    Chloride 104 98 - 107 mmol/L    Carbon Dioxide 25 21 - 31 mmol/L    Anion Gap 8 3 - 14 mmol/L    Glucose 90 70 - 105 mg/dL    Urea Nitrogen 12 7 - 25 mg/dL    Creatinine 0.72 0.60 - 1.20 mg/dL    GFR Estimate 89 >60 mL/min/[1.73_m2]    GFR Estimate If Black >90 >60 mL/min/[1.73_m2]    Calcium 9.1 8.6 - 10.3 mg/dL    Bilirubin Total 0.3 0.3 - 1.0 mg/dL    Albumin 4.2 3.5 - 5.7 g/dL    Protein Total 7.0 6.4 - 8.9 g/dL    Alkaline Phosphatase 68 34 - 104 U/L    ALT 18 7 - 52 U/L    AST 13 13 - 39 U/L   CRP inflammation   Result Value Ref Range    CRP Inflammation 0.7 (H) <0.5 mg/L   TSH Reflex GH   Result Value Ref Range    TSH Reflex 4.28 0.34 - 5.60 IU/mL   UA reflex to Microscopic and Culture   Result Value Ref Range    Color Urine Yellow     Appearance Urine Clear     Glucose Urine Negative NEG^Negative mg/dL    Bilirubin Urine Negative NEG^Negative    Ketones Urine Negative NEG^Negative mg/dL    Specific Gravity Urine 1.025 1.000 - 1.030    Blood Urine Negative NEG^Negative    pH Urine 5.0 5.0 - 9.0 pH    Protein Albumin Urine Negative NEG^Negative mg/dL    Urobilinogen Urine 0.2 0.2 - 1.0 EU/dL    Nitrite Urine Negative NEG^Negative    Leukocyte Esterase Urine Negative NEG^Negative    Source Midstream Urine        ASSESSMENT/PLAN:       ICD-10-CM    1. Dizziness R42 CBC with platelets differential     Comprehensive metabolic panel      UA reflex to Microscopic and Culture     TSH Reflex GH     CRP inflammation     TSH Reflex GH     meclizine (ANTIVERT) 25 MG tablet   2. Viral illness B34.9      PLAN:  Patient is afebrile, her exam is benign, neurologically intact.  She does not appear toxic, she is in no acute distress.  CBC, CMP, TSH, and UA are all essentially negative.  CRP is just slightly elevated at 0.7.  Patient has had ongoing sinus congestion with URI symptoms,  is ill with a viral symptoms as well.  We will treat her with meclizine for dizziness, advised to rest and push fluids.  Monitor symptoms and follow-up with primary care if not improving in the next 2 to 3 days or symptoms worsen.  Given epic education materials.  I explained my diagnostic considerations and recommendations to patient who voiced understanding and agreement with the treatment plan. All questions were answered. We discussed potential side effects of any prescribed or recommended therapies, as well as expectations for response to treatments.    Disclaimer:  This note consists of words and symbols derived from keyboarding, dictation, or using voice recognition software. As a result, there may be errors in the script that have gone undetected. Please consider this when interpreting information found in this note.      FIORDALIZA Peralta, NP-C  8/29/2019 at 1:43 PM  [facility]

## 2019-08-29 NOTE — NURSING NOTE
"Chief Complaint   Patient presents with     Dizziness     Fever   Patient is here for a cold, fever and dizziness that started around 10pm last night. Patient states that she has had the cold for a while and had a 99F fever this morning. Patient has not taken anything for the symptoms. Patient states no ear pain, no vision changes and no hearing changes.       Initial /80   Pulse 80   Temp 97.4  F (36.3  C) (Tympanic)   Resp 18   Ht 1.562 m (5' 1.5\")   Wt 95.9 kg (211 lb 6.4 oz)   SpO2 97%   BMI 39.30 kg/m   Estimated body mass index is 39.3 kg/m  as calculated from the following:    Height as of this encounter: 1.562 m (5' 1.5\").    Weight as of this encounter: 95.9 kg (211 lb 6.4 oz).  Medication Reconciliation: complete    Renata Beard LPN  "

## 2020-08-22 ENCOUNTER — OFFICE VISIT (OUTPATIENT)
Dept: FAMILY MEDICINE | Facility: OTHER | Age: 45
End: 2020-08-22
Attending: FAMILY MEDICINE
Payer: COMMERCIAL

## 2020-08-22 VITALS
RESPIRATION RATE: 18 BRPM | TEMPERATURE: 97.8 F | WEIGHT: 192 LBS | SYSTOLIC BLOOD PRESSURE: 120 MMHG | HEART RATE: 88 BPM | BODY MASS INDEX: 35.69 KG/M2 | DIASTOLIC BLOOD PRESSURE: 76 MMHG

## 2020-08-22 DIAGNOSIS — K04.7 DENTAL INFECTION: Primary | ICD-10-CM

## 2020-08-22 PROCEDURE — 99213 OFFICE O/P EST LOW 20 MIN: CPT | Performed by: FAMILY MEDICINE

## 2020-08-22 RX ORDER — CLINDAMYCIN HCL 300 MG
300 CAPSULE ORAL 3 TIMES DAILY
Qty: 21 CAPSULE | Refills: 0 | Status: SHIPPED | OUTPATIENT
Start: 2020-08-22 | End: 2022-04-07

## 2020-08-22 ASSESSMENT — PAIN SCALES - GENERAL: PAINLEVEL: MODERATE PAIN (5)

## 2020-08-22 NOTE — NURSING NOTE
"Chief Complaint   Patient presents with     Dental Pain   Pt present to clinic today for dental pain. Has been going on for a month and has worsened over the last week. She also has facial swelling.    Initial /76 (BP Location: Right arm, Patient Position: Sitting, Cuff Size: Adult Large)   Pulse 88   Temp 97.8  F (36.6  C) (Tympanic)   Resp 18   Wt 87.1 kg (192 lb)   BMI 35.69 kg/m   Estimated body mass index is 35.69 kg/m  as calculated from the following:    Height as of 8/29/19: 1.562 m (5' 1.5\").    Weight as of this encounter: 87.1 kg (192 lb).  Medication Reconciliation: complete    Paula Horn LPN  "

## 2020-08-22 NOTE — PROGRESS NOTES
CC: Dental pain and facial swelling    HPI: Pleasant 41 throughout late is developed marked inflammation and swelling of the left side of her face.  She states sometime during the night they Came off whenever teeth on the upper left side.  She woke up in the morning and this was gone and she started having some swelling that has progressed over the last 24 hours.  She does have a family dentist that she is planning on going to see for help with this problem.  She thinks it could require a root canal type of procedure.  Patient admits that she has had a lot of dental problems throughout her life which has been very similar to what her mother also experienced with her dentition.    PMH medications have included Proventil Zyrtec and Motrin for discomfort   Allergies: To amoxicillin    ROS: She is not having any fever chills or other constitutional complaints.  But has been moderately uncomfortable for her and is very tender in the area where she has a Workaround that come off of her left sided upper incisor.    Exam alert cooperative patient who appears to be in no distress and does not have a fever and has acceptable vital signs  Oral inspection demonstrates teeth that are in fair condition with a tooth that has had loss of its crown or As she refers to it.  This is the frontal left-sided incisor in front of her 3 molar teeth.    Assessment dental pain with probable secondary infection from loss of crown that had been previously established.  With the amount of tenderness and swelling in this region would have to think that there may be significant infection going on.    Plan given the above examination findings and history would appear beneficial for the patient to be started on broad-spectrum oral antibiotics.  Because of her allergy to amoxicillin will use clindamycin 300 mg 3 times a day for the next week while she waits to get into see her family dentist.

## 2022-04-07 ENCOUNTER — OFFICE VISIT (OUTPATIENT)
Dept: FAMILY MEDICINE | Facility: OTHER | Age: 47
End: 2022-04-07
Attending: PHYSICIAN ASSISTANT
Payer: COMMERCIAL

## 2022-04-07 VITALS
BODY MASS INDEX: 37.1 KG/M2 | RESPIRATION RATE: 18 BRPM | TEMPERATURE: 98.5 F | HEIGHT: 62 IN | WEIGHT: 201.6 LBS | OXYGEN SATURATION: 97 %

## 2022-04-07 DIAGNOSIS — R42 DIZZINESS: ICD-10-CM

## 2022-04-07 DIAGNOSIS — J01.90 ACUTE SINUSITIS WITH SYMPTOMS > 10 DAYS: Primary | ICD-10-CM

## 2022-04-07 DIAGNOSIS — H65.93 BILATERAL NON-SUPPURATIVE OTITIS MEDIA: ICD-10-CM

## 2022-04-07 PROCEDURE — 99213 OFFICE O/P EST LOW 20 MIN: CPT | Performed by: PHYSICIAN ASSISTANT

## 2022-04-07 RX ORDER — DIPHENOXYLATE HYDROCHLORIDE AND ATROPINE SULFATE 2.5; .025 MG/1; MG/1
1 TABLET ORAL DAILY
COMMUNITY
End: 2022-04-07

## 2022-04-07 RX ORDER — AZITHROMYCIN 250 MG/1
TABLET, FILM COATED ORAL
Qty: 6 TABLET | Refills: 0 | Status: SHIPPED | OUTPATIENT
Start: 2022-04-07 | End: 2022-04-12

## 2022-04-07 RX ORDER — BENZONATATE 100 MG/1
100 CAPSULE ORAL 3 TIMES DAILY PRN
COMMUNITY
Start: 2022-03-18 | End: 2022-04-07

## 2022-04-07 RX ORDER — NEOMYCIN POLYMYXIN B SULFATES AND DEXAMETHASONE 3.5; 10000; 1 MG/ML; [USP'U]/ML; MG/ML
2 SUSPENSION/ DROPS OPHTHALMIC
COMMUNITY
Start: 2022-03-30 | End: 2022-04-07

## 2022-04-07 RX ORDER — MECLIZINE HYDROCHLORIDE 25 MG/1
25 TABLET ORAL 3 TIMES DAILY PRN
Qty: 30 TABLET | Refills: 0 | Status: SHIPPED | OUTPATIENT
Start: 2022-04-07

## 2022-04-07 ASSESSMENT — PAIN SCALES - GENERAL: PAINLEVEL: NO PAIN (0)

## 2022-04-07 NOTE — NURSING NOTE
"Chief Complaint   Patient presents with     Dizziness     Patient is here for dizziness that started yesterday. Patient states she has been sick for about 3 weeks. Patient states she did vomit yesterday and declines fevers. Patient states that yesterday sound bothered her but it has since went away.     Initial Temp 98.5  F (36.9  C) (Tympanic)   Resp 18   Ht 1.562 m (5' 1.5\")   Wt 91.4 kg (201 lb 9.6 oz)   SpO2 97%   BMI 37.48 kg/m   Estimated body mass index is 37.48 kg/m  as calculated from the following:    Height as of this encounter: 1.562 m (5' 1.5\").    Weight as of this encounter: 91.4 kg (201 lb 9.6 oz).  Medication Reconciliation: complete    Renata Beard, ERIC  "

## 2022-04-07 NOTE — PATIENT INSTRUCTIONS
Please refer to your AVS for follow up and pain/symptoms management recommendations (I.e.: medications, helpful conservative treatment modalities, appropriate follow up if need to a specialist or family practice, etc.). Please return to urgent care if your symptoms change or worsen.     Discharge instructions:  -If you were prescribed a medication(s), please take this as prescribed/directed  -Monitor your symptoms, if changing/worsening, return to UC/ER or PCP for follow up    Sinus Infection:   1. Dry out congestion with flonase (1spray in both nostrils 2x daily for 3-5 days) and pseudoephedrine (1-2 tabs every 4-6 hrs for 3-5 days) unless contraindicated     2. Use a saline spray/Neti Pot/sinus flush ([employer] Med Sinus Rinse) 2-3 times daily to irrigate sinuses/mucosal tissue. This dilutes and moves secretions.     3. Tylenol or ibuprofen for pain and fevers - alternate every 4 hours as needed. I.e.: Ibuprofen at 8am, Tylenol 12pm, Ibuprofen 4pm    -Daily maximum of Tylenol is 4000mg (recommend staying under 3000mg)   -Daily maximum of Ibuprofen is 1200mg (take no more than six 200mg pills a day)    4. Plenty of fluids and rest as needed.     5. Chew, yawn and speak to help eustachian tubes drain.     * If you are a smoker, try to quit *     - Consider the following over-the-counter products if you are older than 1 year and not pregnant: honey/chestal for cough relief and sambucus/elderberry for viral upper-respiratory symptoms.    You were prescribed an antibiotic, please take into consideration the following information:  - Take entire course of antibiotic even if you start to feel better.  - Antibiotics can cause stomach upset including nausea and diarrhea. Read your bottle or ask the pharmacist if antibiotic can be taken with food to help prevent nausea. If you have symptoms of diarrhea you can take an over-the-counter probiotic and/or increase foods with probiotics such as yogurt, [place], sauerkraut.  -Use  caution in sunlight as can lead to increased risk of sunburn while on ABX (antibiotics).     - Start with 1/2 tablet of Meclizine - increase up to 1 tablet three times a day as needed

## 2022-04-07 NOTE — PROGRESS NOTES
ASSESSMENT/PLAN:    I have reviewed the nursing notes.  I have reviewed the findings, diagnosis, plan and need for follow up with the patient.    1. Acute sinusitis with symptoms > 10 days  2. Bilateral non-suppurative otitis media  - azithromycin (ZITHROMAX) 250 MG tablet; Take 2 tablets (500 mg) by mouth daily for 1 day, THEN 1 tablet (250 mg) daily for 4 days.  Dispense: 6 tablet; Refill: 0  - Vital signs stable. PE consistent with sinusitis. Discussed with patient that we recommend: to dry out congestion with flonase (1spray in both nostrils 2x daily for 3-5 days) and pseudoephedrine (1-2 tabs every 4-6 hrs for 3-5 days) unless contraindicated, use a saline spray/Neti Pot/sinus flush ([facility]angelo Med Sinus Rinse) 2-3 times daily to irrigate sinuses/mucosal tissue. This dilutes and moves secretions. Alternate Tylenol or ibuprofen for pain and fevers - alternate every 4 hours as needed. Recommend plenty of fluids and rest as needed. Discussed that if a smoker, tobacco cessation recommended. Discussed that we normally do not treat sinus infections of less than 10 days duration with oral antibiotics as these are typically viral in nature. Discussed that if an antibiotic was prescribed today for bacterial sinusitis to take the entire course of antibiotic, discussed side effect profile of prescribed medications. Patient is in agreement and understanding of the above treatment plan. All questions and concerns were addressed and answered to patient's satisfaction. AVS reviewed with patient.     For ears: warm compresses, other symptomatic remedies. Avoid trauma to ear(s) such as Q-tips. If symptoms change or worsen, recommend follow up for reevaluation (high fevers, worsening pain, abnormal drainage or odor from ear, etc.).     3. Dizziness  - meclizine (ANTIVERT) 25 MG tablet; Take 1 tablet (25 mg) by mouth 3 times daily as needed for dizziness  Dispense: 30 tablet; Refill: 0  - Normal neurologic examination today - see  below. Meclizine as needed for dizziness, discussed side effects of medications. Increase fluids at home. Recommend follow up in ER with persistent symptoms, worsening dizziness, vision changes, neurologic changes, etc. Patient agreeable to this.     Discussed warning signs/symptoms indicative of need to f/u    Follow up if symptoms persist or worsen or concerns    I explained my diagnostic considerations and recommendations to the patient, who voiced understanding and agreement with the treatment plan. All questions were answered. We discussed potential side effects of any prescribed or recommended therapies, as well as expectations for response to treatments.    Paula Lauren PA-C  4/7/2022  11:37 AM    HPI:    Marialuisa Salgado is a 46 year old female  who presents to [facility] today for concerns of URI, x 2-3 weeks with dizziness over the last few days.     Symptoms:  Yes fevers or chills. Fever, highest reported temperature: low grade  No sore throat/pharyngitis/tonsillitis.   Yes allergy/URI Symptoms  Yes Muffled Sounds/Change in Hearing  Yes Sensation of Fullness in Ear(s)  No Ringing in Ears/Tinnitus  No Balance Changes  Yes Dizziness  Yes Congestion (head/nasal/chest)  Yes Cough/Productive Cough - dry cough (non productive)  Yes Post Nasal Drip - color: clear  Yes Headache  Yes Sinus Pain/Pressure  Yes Myalgias  No Otalgia  Activity Level Changes: Yes: tired  Appetite/Liquid Intake Changes: Yes: lower solid intake  Changes to Bowel Habits: Yes: diarrhea and loose stools x 2 days  Changes to Bladder Habits: No  Additional Symptoms to Report: No  History of similar symptoms: No  Prior workup: No    Treatments tried: Tylenol/Ibuprofen, Decongestants, Fluids, Rest and motion sickness pills (OTC)    Site of exposure: not known.  Type of exposure: not known    Vaccination status:   - Influenza: not immunized at this time  - COVID: 5/19/21, 6/22/21, 1/21/22    Allergies: PCN, Sulfa    PCP: [facility]  "MD    History reviewed. No pertinent past medical history.  History reviewed. No pertinent surgical history.  Social History     Tobacco Use     Smoking status: Never Smoker     Smokeless tobacco: Never Used   Substance Use Topics     Alcohol use: No     Current Outpatient Medications   Medication Sig Dispense Refill     diphenhydrAMINE (BENADRYL) 25 MG tablet Take 25 mg by mouth every 6 hours as needed        acetaminophen (TYLENOL) 500 MG tablet Take 1,000 mg by mouth (Patient not taking: Reported on 4/7/2022)       albuterol (PROVENTIL HFA) 108 (90 Base) MCG/ACT inhaler Inhale 2 puffs into the lungs (Patient not taking: Reported on 4/7/2022)       benzonatate (TESSALON) 100 MG capsule Take 100 mg by mouth 3 times daily as needed (Patient not taking: Reported on 4/7/2022)       cetirizine (ZYRTEC) 10 MG tablet Take 10 mg by mouth (Patient not taking: Reported on 4/7/2022)       clindamycin (CLEOCIN) 300 MG capsule Take 1 capsule (300 mg) by mouth 3 times daily (Patient not taking: Reported on 4/7/2022) 21 capsule 0     ibuprofen (ADVIL/MOTRIN) 800 MG tablet Take 800 mg by mouth (Patient not taking: Reported on 4/7/2022)       Multiple Vitamin (MULTI-VITAMINS) TABS Take 1 tablet by mouth daily (Patient not taking: Reported on 4/7/2022)       neomycin-polymixin-dexamethasone (MAXITROL) 0.1 % ophthalmic suspension 2 drops (Patient not taking: Reported on 4/7/2022)       Zinc 50 MG CAPS Take 50 mg by mouth daily (Patient not taking: Reported on 4/7/2022)       Allergies   Allergen Reactions     Sulfamethoxazole W/Trimethoprim Rash and Swelling     Amoxicillin Rash     Past medical history, past surgical history, current medications and allergies reviewed and accurate to the best of my knowledge.      ROS:  Refer to HPI    Temp 98.5  F (36.9  C) (Tympanic)   Resp 18   Ht 1.562 m (5' 1.5\")   Wt 91.4 kg (201 lb 9.6 oz)   SpO2 97%   BMI 37.48 kg/m      EXAM:  General Appearance: Well appearing 46 year old female, " appropriate appearance for age. No acute distress   Ears: Bilateral ears with TM intact, bulging, serous effusions.  Left auditory canal clear.  Right auditory canal clear.  Normal external ears, non tender.  Eyes: conjunctivae normal without erythema or irritation, corneas clear, no drainage or crusting, no eyelid swelling, pupils equal   Oropharynx: moist mucous membranes, posterior pharynx without erythema, tonsils symmetric, no erythema, no exudates or petechiae, no post nasal drip seen, no trismus, voice clear.    Sinuses:  Bilateral maxillary sinus tenderness and fullness, normal frontal sinus examination   Nose:  Bilateral nares: no erythema, no edema, + congestion  Neck: supple without adenopathy  Respiratory: normal chest wall and respirations.  Normal effort.  Clear to auscultation bilaterally, no wheezing, crackles or rhonchi.  No increased work of breathing.  No cough appreciated.   Cardiac: RRR with no murmurs    Dermatological: no rashes noted of exposed skin  Neurologic: Awake, alert, oriented to name, place and time.  Cranial nerves II-XII are grossly intact.  Motor shows good stregth, bulk and tone bilaterally.  Cerebellar finger to nose, heel to shin intact.  Sensory is intact to fine touch and pin prick.  Babinski down going, Romberg negative, and gait is normal.  Psychological: normal affect, alert, oriented, and pleasant.     Labs:  None     Xray:  None